# Patient Record
(demographics unavailable — no encounter records)

---

## 2025-03-13 NOTE — ASSESSMENT
[FreeTextEntry1] : L sided tinnitus: ear exam is unremarkable - MRI Brain IAC eval for AN 2/2 unilateral tinnitus / syncope - Audio shows hearing wnl but very mild decrease in high freq SNHL  -Will call with results of the MRI - I do not suspect this is vestibular, vestibular issues do not typically result in syncope.  Would like more pressing issues with neuro and cardiology to be evaluated first, if still symptomatic and workup is negative, could consider vestibular rehab - f/up depending on results  Essential hypertension Hypercholesterolemia

## 2025-03-13 NOTE — REASON FOR VISIT
[Initial Consultation] : an initial consultation for [Tinnitus] : tinnitus [FreeTextEntry2] : b/l tinnitus / L buzzing noise for last 2 months

## 2025-03-13 NOTE — CONSULT LETTER
[Dear  ___] : Dear  [unfilled], [Consult Letter:] : I had the pleasure of evaluating your patient, [unfilled]. [Consult Closing:] : Thank you very much for allowing me to participate in the care of this patient.  If you have any questions, please do not hesitate to contact me. [Please see my note below.] : Please see my note below. [FreeTextEntry3] : Sary Macario MD Otolaryngology- Facial Plastics  [address] [place] [id number] (P) - [phone] (F) - [phone]

## 2025-03-13 NOTE — HISTORY OF PRESENT ILLNESS
[de-identified] : Ms. ANTUNEZ is a 46 year female s/p revision abdominal surgery in January 2025, two weeks after surgery she started with buzzing noise L ear which lasts for 1-2 hours. has had high pitched ringing in both ears for 2 years she fells like she had a bug in the ear, decreased hearing over the last few years - denies otalgia, otorrhea  this past Monday she passed out on the street. taken by ambulance to [facility]. did not have any imaging of the head was told to f/u as outpatient EKG had some changes she was told to f/u with ENT, Cardio and Neuro she has a h/o syncope but has always been able to catch herself. she is currently fasting this month no room spinning associated with head turning.  Does have occasionally the sensation she  PMH DM on diet controlled.

## 2025-03-13 NOTE — END OF VISIT
[FreeTextEntry3] : I personally saw and examined JESUS ANTUNEZ  in detail. I spoke to VERÓNICA Bentley regarding the assessment and plan of care. I performed the procedures and relevant physical exam. I have made changes to the body of the note wherever necessary and appropriate.

## 2025-03-20 NOTE — HISTORY OF PRESENT ILLNESS
[FreeTextEntry1] : Ms. Jhonatan Deleon is a 45 y/o woman with Hx of DM, GERD, s/p liposuction with revision for adhesions surgery 1/2025 and syncope who presents for an initial evaluation. As per ED note and Patient, she was driving today after dropping off her kids at school. She began to feel lightheaded and short of breath, so she pulled over. She opened the door to her car but reports losing consciousness, but she doesn't remember the events clearly after opening the door. She was found on the street by a  and was pulled to the sidewalk, at which point she was regaining consciousness but hazy. She then had another syncopal episode on the sidewalk. Possible head strike on both episodes. She woke up in EMS with pain in her head, neck, thumbs, and L knee. She states she has a history of fainting since she was a child but had a negative basic workup for it. Sister at bedside states that it has never happened this seriously, and it's never been associated with SOB. Her BP was low with EMS reportedly, BG in 300s. She is fasting today. Otherwise denies recent illness, infectious symptoms, chest pain, palpitations, abdominal pain, nausea, or other acute symptoms. She had abrasions to R cheek, scalp, L and K knee, R elbow.  States she has not taken Monjaro since surgery in January and has not felt right since surgery.  She followed up with ENT for tinnitus and neuro and had a negative MRI. She has an ambulatory EEG scheduled.  Currently, she has episodes of dizziness with negative orthostatics today. She has not had any recent cardiac testing. B/P supine 137/91, 86   sitting 125/90 HR 86 and standing 120/91 HR 90  Denies chest pain, palpitations, SOB,

## 2025-03-20 NOTE — DISCUSSION/SUMMARY
[EKG obtained to assist in diagnosis and management of assessed problem(s)] : EKG obtained to assist in diagnosis and management of assessed problem(s) [FreeTextEntry1] : 45 y/o woman with Hx of DM, GERD, s/p liposuction with revision for adhesions surgery 1/2025 and syncope who presents for an initial evaluation after recent traumatic syncope.   #Syncope - reflex syncope vs cardiac. By symptoms and her long history of syncope,  I suspect her symptoms are due to reflex syncope (vasovagal). However, will rule out for any potential cardiac causes first. Will apply a Zio AT monitor for 30 days to assess for arrhythmias. Will order an echocardiogram. The patient will RTC for f/u in 6 weeks to discuss the results and decide on further steps.

## 2025-06-12 NOTE — HISTORY OF PRESENT ILLNESS
[Gradual] : gradual [Result of repetitive motion] : result of repetitive motion [Constant] : constant [Leisure] : leisure [Social interactions] : social interactions [Full time] : Work status: full time [de-identified] : 06/12/2025: 46 y F presents with LT hip pain and B/L thigh pain for 4 weeks after playing volleyball. She reports the hip pain started first and her thigh pain started shortly after. There's tenderness to touch. Sleep is affected. Denies n/t. NO NSAIDS use.   She also c/o of neck pain with rotation. She reports she's on her phone a lot for work. Denies n/t. No OTC med use.     [] : Post Surgical Visit: no [FreeTextEntry5] : Left Hip and Bl Thigh pain , pain for 4 weeks after playing Volleyball  pain to touch  Hip ia pain to sleep on the side  [de-identified] : MD CONTRERAS

## 2025-06-12 NOTE — ASSESSMENT
[FreeTextEntry1] : 06/12/2025: BT Pelvis w/hips x-rays, 2 views, reveals no fx/abnormalities. C spine x-rays, 3 views, reveals muscle spasms and loss of lordosis.  Underlying pathology reviewed and treatment options discussed. Obtain MRI PELVIS.  Start PT and HEP to improve mechanics and reduce pain. Activity modification as tolerated. Treatment options including medications such as NSAIDs discussed. Questions addressed. Follow up after MRI.  The documentation recorded by the scribe accurately reflects the service I personally performed and the decisions made by me. I, Jailene Guzman, attest that this documentation has been prepared under the direction and in the presence of Provider Kulwinder Sánchez MD.   The patient was seen by Kulwinder Sánchez MD.

## 2025-06-12 NOTE — DISCUSSION/SUMMARY
[de-identified] : The patient was advised of the diagnosis. The natural history of the pathology was explained in full to the patient in layman's terms. The risks and benefits of surgical and non-surgical treatment alternatives were explained in full to the patient. All questions were answered.